# Patient Record
Sex: FEMALE | Race: ASIAN | NOT HISPANIC OR LATINO
[De-identification: names, ages, dates, MRNs, and addresses within clinical notes are randomized per-mention and may not be internally consistent; named-entity substitution may affect disease eponyms.]

---

## 2017-02-23 ENCOUNTER — APPOINTMENT (OUTPATIENT)
Dept: OBGYN | Facility: CLINIC | Age: 52
End: 2017-02-23

## 2017-03-23 ENCOUNTER — APPOINTMENT (OUTPATIENT)
Dept: OBGYN | Facility: CLINIC | Age: 52
End: 2017-03-23

## 2017-05-04 ENCOUNTER — APPOINTMENT (OUTPATIENT)
Dept: OBGYN | Facility: CLINIC | Age: 52
End: 2017-05-04

## 2017-05-04 VITALS
RESPIRATION RATE: 14 BRPM | DIASTOLIC BLOOD PRESSURE: 78 MMHG | WEIGHT: 150 LBS | HEIGHT: 61 IN | OXYGEN SATURATION: 99 % | SYSTOLIC BLOOD PRESSURE: 100 MMHG | BODY MASS INDEX: 28.32 KG/M2 | HEART RATE: 56 BPM

## 2017-05-04 DIAGNOSIS — Z82.61 FAMILY HISTORY OF ARTHRITIS: ICD-10-CM

## 2017-05-04 DIAGNOSIS — Z13.820 ENCOUNTER FOR SCREENING FOR OSTEOPOROSIS: ICD-10-CM

## 2017-05-04 DIAGNOSIS — Z01.419 ENCOUNTER FOR GYNECOLOGICAL EXAMINATION (GENERAL) (ROUTINE) W/OUT ABNORMAL FINDINGS: ICD-10-CM

## 2017-05-04 DIAGNOSIS — R10.2 PELVIC AND PERINEAL PAIN: ICD-10-CM

## 2017-05-04 DIAGNOSIS — Z12.39 ENCOUNTER FOR OTHER SCREENING FOR MALIGNANT NEOPLASM OF BREAST: ICD-10-CM

## 2017-05-05 LAB — HPV HIGH+LOW RISK DNA PNL CVX: NEGATIVE

## 2017-05-10 LAB — CYTOLOGY CVX/VAG DOC THIN PREP: NORMAL

## 2019-03-26 ENCOUNTER — OUTPATIENT (OUTPATIENT)
Dept: OUTPATIENT SERVICES | Facility: HOSPITAL | Age: 54
LOS: 1 days | Discharge: HOME | End: 2019-03-26

## 2019-03-27 DIAGNOSIS — F32.9 MAJOR DEPRESSIVE DISORDER, SINGLE EPISODE, UNSPECIFIED: ICD-10-CM

## 2019-06-13 ENCOUNTER — APPOINTMENT (OUTPATIENT)
Dept: OBGYN | Facility: CLINIC | Age: 54
End: 2019-06-13

## 2019-07-31 ENCOUNTER — EMERGENCY (EMERGENCY)
Facility: HOSPITAL | Age: 54
LOS: 1 days | Discharge: HOME | End: 2019-07-31
Attending: EMERGENCY MEDICINE | Admitting: EMERGENCY MEDICINE
Payer: OTHER MISCELLANEOUS

## 2019-07-31 VITALS
HEART RATE: 67 BPM | OXYGEN SATURATION: 100 % | SYSTOLIC BLOOD PRESSURE: 154 MMHG | DIASTOLIC BLOOD PRESSURE: 90 MMHG | RESPIRATION RATE: 16 BRPM | TEMPERATURE: 98 F

## 2019-07-31 PROCEDURE — 99284 EMERGENCY DEPT VISIT MOD MDM: CPT

## 2019-07-31 RX ORDER — ACETAMINOPHEN 500 MG
975 TABLET ORAL ONCE
Refills: 0 | Status: COMPLETED | OUTPATIENT
Start: 2019-07-31 | End: 2019-07-31

## 2019-07-31 RX ADMIN — Medication 975 MILLIGRAM(S): at 22:01

## 2019-07-31 NOTE — ED ADULT TRIAGE NOTE - CHIEF COMPLAINT QUOTE
pt s/p slip and fall. states she landed on her left side and is endorsing left sided head pain. no loc or blood thinner use

## 2019-07-31 NOTE — ED PROVIDER NOTE - CLINICAL SUMMARY MEDICAL DECISION MAKING FREE TEXT BOX
s/p mechanical fall earlier today now with HA, no neuro deficits on the exam, clinically concussion - will get ct head, pain control, will reassess;

## 2019-07-31 NOTE — ED PROVIDER NOTE - ATTENDING CONTRIBUTION TO CARE
Davidsonjaved: 55 yo female with a h/o DM s/p mechanical trip and fall with head trauma. No LOC. + left sided headache. + left sided neck and shoulder pain. No nausea or vomiting. No weakness or paresthesias. No chest or abdominal pain, or SOB. No anticoagulation use. Exam: GENERAL: well appearing, NAD, HEENT: MMM, PERRLA, CARDIO: +S1/S2, no murmurs, rubs or gallops, LUNGS: CTA B/L, no wheezing, rales or rhonchi, ABD: soft, nontender, BSx4 quadrants, no guarding or rigidity. MSK: NO ribg ttp, NO shoulder TTP, no mdiline spinal TTP, + left sided paraspinal cervical TTP. EXT: No LE edema or calf TTP, 2+ distal pulses x 4 extremities. NEURO: AxOx3, CNII-XII intact, SKIN: no rashes or lesions, well perfused A/P- 55 yo female s/p head trauma with headache. normal neuro exam and no anticoagulation use but pt c/o persistent headache over temporal region and given area will CT head and treat symptomatically.

## 2019-07-31 NOTE — ED PROVIDER NOTE - NSFOLLOWUPINSTRUCTIONS_ED_ALL_ED_FT
Follow up with your PMD within 24-48 hrs hours.  Rest, Take Tylenol 650mg 1 tab every 4-6 hours as needed for pain . You may have a headache associated with nausea and lightheadedness in the next few hours/days. This is called a concussion and does not warrant return to the Emergency department unless you develop significant worsening of pain, profuse vomiting, dizziness, changes in vision, difficulty walking/speaking, weakness or numbness to your extremities. Worsening or new concerning symptoms return to the emergency department.Concussion, Adult  Image   A concussion is a brain injury from a direct hit (blow) to the head or body. This blow causes the brain to shake quickly back and forth inside the skull. This can damage brain cells and cause chemical changes in the brain. A concussion may also be known as a mild traumatic brain injury (TBI).    Concussions are usually not life-threatening, but the effects of a concussion can be serious. If you have a concussion, you should be very careful to avoid having a second concussion.    What are the causes?  This condition is caused by:  A direct blow to your head, such as from running into another player during a game, being hit in a fight, or hitting your head on a hard surface.  Sudden movement of your body that causes your brain to move back and forth inside the skull, such as in a car crash.  What are the signs or symptoms?  The signs of a concussion can be hard to notice. Early on, they may be missed by you, family members, and health care providers. You may look fine on the outside, but may act or feel differently.    Symptoms are usually temporary, and for most adults, the symptoms improve in 7–10 days. Some symptoms may appear right away, but other symptoms may not show up for hours or days. If your symptoms last longer than normal, you may have post-concussion syndrome. Every head injury is different.    Physical symptoms     Headaches. This can include a feeling of pressure in the head or migraine-like symptoms.  Tiredness (fatigue).  Dizziness.  Problems with coordination or balance.  Vision or hearing problems.  Sensitivity to light or noise.  Nausea or vomiting.  Changes in eating or sleeping patterns.  Numbness or tingling.  Mental and emotional symptoms     Memory problems.  Trouble concentrating, organizing, or making decisions.  Slowness in thinking, acting or reacting, speaking, or reading.  Irritability or mood changes.  Anxiety or depression.  How is this diagnosed?  This condition is diagnosed based on:  Your symptoms.  A description of your injury.  You may also have tests, including:  Imaging tests, such as a CT scan or MRI. These are done to look for signs of brain injury.  Neuropsychological tests. These measure your thinking, understanding, learning, and remembering abilities.  How is this treated?  Treatment for this condition includes:  Stopping sports or activity if you are injured. Anyone who hits their head or shows signs of a concussion should not return to sports or activities the same day, and they should not return until they are checked by a health care provider.  Physical and mental rest and careful observation, usually at home. Gradually return to your normal activities.  Medicine. You may be prescribed medicines to help with symptoms such as headaches, nausea, or difficulty sleeping.  Avoid taking opioid pain medicine while recovering from a concussion.  Avoiding alcohol and drugs. Alcohol and certain drugs may slow your recovery and can put you at risk of further injury.  Referral to a concussion clinic or rehabilitation center.  How fast you will recover from a concussion depends on many factors. Recovery can take time. It is important to wait to return to activity until a health care provider says that it is safe and your symptoms are completely gone.    Follow these instructions at home:  Activity     Limit activities that require a lot of thought or concentration, such as:  Doing homework or job-related work.  Watching TV.  Working on the computer.  Playing memory games and puzzles.  Rest. Rest helps your brain heal. Make sure you:  Get plenty of sleep. Most adults should get 7–9 hours of sleep each night.  Rest during the day. Take naps or rest breaks when you feel tired.  Do not do high-risk activities that could cause a second concussion, such as riding a bike or playing sports. Having another concussion before the first one has healed can be dangerous.  Ask your health care provider when you can return to your normal activities, such as school, work, athletics, and driving. Your ability to react may be slower after a brain injury. Never do these activities if you are dizzy. Your health care provider will likely give you a plan for gradually returning to activities.  General instructions     Take over-the-counter and prescription medicines only as told by your health care provider. Some medicines, such as blood thinners (anticoagulants) and aspirin, may increase the chance of complications, such as bleeding.  Do not drink alcohol until your health care provider says you can.  Watch your symptoms and tell others to do the same. Complications sometimes occur after a concussion. Older adults with a brain injury may have a higher risk of serious complications.  Tell your , teachers, school nurse, school counselor, , or  about your injury, symptoms, and restrictions. Tell them about what you can or cannot do. They should watch you for worsening symptoms.  Keep all follow-up visits as told by your health care provider. This is important.  How is this prevented?  Avoiding another brain injury is very important, especially as you recover. In rare cases, another injury can lead to permanent brain damage, brain swelling, or death. The risk of this is greatest during the first 7–10 days after a head injury. Avoid injuries by:  Avoiding activities that could lead to a second concussion, such as contact or recreational sports, until your health care provider says it is okay.  Taking these actions once you have returned to sports or activities:  Avoiding plays or moves that can cause you to crash into another person. This is how most concussions occur.  Following the rules and being respectful of other players. Do not engage in violent or illegal plays.  Getting regular exercise that includes strength and balance training.  Wear a properly fitting helmet during sports, biking, or other activities. Helmets can help protect you from serious skull and brain injuries, but they do not protect you from a concussion. Even when wearing a helmet, you should avoid being hit in the head.    Contact a health care provider if:  Your symptoms get worse or they do not improve.  You have new symptoms.  You have another injury.  Get help right away if:  You have severe or worsening headaches.  You have weakness or numbness in any part of your body.  You are confused.  Your coordination gets worse.  You vomit repeatedly.  You are sleepier than normal.  You have convulsions.  Your speech is slurred.  You cannot recognize people or places.  You have a seizure.  It is difficult to wake you up.  You have unusual behavior changes.  You have changes in your vision.  You lose consciousness.  Summary  A concussion is a brain injury from a direct hit (blow) to your head or body.  You may have imaging tests and neuropsychological tests to diagnose a concussion.  Treatment for this condition includes physical and mental rest and careful observation.  Ask your health care provider when you can return to your normal activities, such as school, work, athletics, and

## 2019-08-01 PROCEDURE — 70450 CT HEAD/BRAIN W/O DYE: CPT | Mod: 26

## 2019-08-01 PROCEDURE — 70480 CT ORBIT/EAR/FOSSA W/O DYE: CPT | Mod: 26,59

## 2019-08-02 ENCOUNTER — TRANSCRIPTION ENCOUNTER (OUTPATIENT)
Age: 54
End: 2019-08-02

## 2020-04-25 ENCOUNTER — MESSAGE (OUTPATIENT)
Age: 55
End: 2020-04-25

## 2020-05-12 LAB
SARS-COV-2 IGG SERPL IA-ACNC: 5.9 INDEX
SARS-COV-2 IGG SERPL QL IA: POSITIVE

## 2024-06-15 ENCOUNTER — EMERGENCY (EMERGENCY)
Facility: HOSPITAL | Age: 59
LOS: 1 days | Discharge: HOME | End: 2024-06-15
Attending: STUDENT IN AN ORGANIZED HEALTH CARE EDUCATION/TRAINING PROGRAM | Admitting: STUDENT IN AN ORGANIZED HEALTH CARE EDUCATION/TRAINING PROGRAM
Payer: COMMERCIAL

## 2024-06-15 VITALS
OXYGEN SATURATION: 100 % | DIASTOLIC BLOOD PRESSURE: 80 MMHG | TEMPERATURE: 98 F | RESPIRATION RATE: 16 BRPM | WEIGHT: 139.99 LBS | HEART RATE: 70 BPM | SYSTOLIC BLOOD PRESSURE: 150 MMHG

## 2024-06-15 PROBLEM — E11.9 TYPE 2 DIABETES MELLITUS WITHOUT COMPLICATIONS: Chronic | Status: ACTIVE | Noted: 2019-07-31

## 2024-06-15 LAB
ADD ON TEST-SPECIMEN IN LAB: SIGNIFICANT CHANGE UP
ALBUMIN SERPL ELPH-MCNC: 4 G/DL — SIGNIFICANT CHANGE UP (ref 3.3–5)
ALP SERPL-CCNC: 75 U/L — SIGNIFICANT CHANGE UP (ref 40–120)
ALT FLD-CCNC: 13 U/L — SIGNIFICANT CHANGE UP (ref 4–33)
ANION GAP SERPL CALC-SCNC: 10 MMOL/L — SIGNIFICANT CHANGE UP (ref 7–14)
ANISOCYTOSIS BLD QL: SLIGHT — SIGNIFICANT CHANGE UP
AST SERPL-CCNC: 18 U/L — SIGNIFICANT CHANGE UP (ref 4–32)
BASOPHILS # BLD AUTO: 0 K/UL — SIGNIFICANT CHANGE UP (ref 0–0.2)
BASOPHILS NFR BLD AUTO: 0 % — SIGNIFICANT CHANGE UP (ref 0–2)
BILIRUB SERPL-MCNC: 0.3 MG/DL — SIGNIFICANT CHANGE UP (ref 0.2–1.2)
BUN SERPL-MCNC: 16 MG/DL — SIGNIFICANT CHANGE UP (ref 7–23)
CALCIUM SERPL-MCNC: 10.3 MG/DL — SIGNIFICANT CHANGE UP (ref 8.4–10.5)
CHLORIDE SERPL-SCNC: 105 MMOL/L — SIGNIFICANT CHANGE UP (ref 98–107)
CO2 SERPL-SCNC: 23 MMOL/L — SIGNIFICANT CHANGE UP (ref 22–31)
CREAT SERPL-MCNC: 0.77 MG/DL — SIGNIFICANT CHANGE UP (ref 0.5–1.3)
DACRYOCYTES BLD QL SMEAR: SLIGHT — SIGNIFICANT CHANGE UP
EGFR: 89 ML/MIN/1.73M2 — SIGNIFICANT CHANGE UP
ELLIPTOCYTES BLD QL SMEAR: SIGNIFICANT CHANGE UP
EOSINOPHIL # BLD AUTO: 0.34 K/UL — SIGNIFICANT CHANGE UP (ref 0–0.5)
EOSINOPHIL NFR BLD AUTO: 5.2 % — SIGNIFICANT CHANGE UP (ref 0–6)
FLUAV AG NPH QL: SIGNIFICANT CHANGE UP
FLUBV AG NPH QL: SIGNIFICANT CHANGE UP
GIANT PLATELETS BLD QL SMEAR: PRESENT — SIGNIFICANT CHANGE UP
GLUCOSE SERPL-MCNC: 110 MG/DL — HIGH (ref 70–99)
HCT VFR BLD CALC: 35.7 % — SIGNIFICANT CHANGE UP (ref 34.5–45)
HGB BLD-MCNC: 11.3 G/DL — LOW (ref 11.5–15.5)
HYPOCHROMIA BLD QL: SLIGHT — SIGNIFICANT CHANGE UP
IANC: 3.32 K/UL — SIGNIFICANT CHANGE UP (ref 1.8–7.4)
LYMPHOCYTES # BLD AUTO: 2.07 K/UL — SIGNIFICANT CHANGE UP (ref 1–3.3)
LYMPHOCYTES # BLD AUTO: 31.9 % — SIGNIFICANT CHANGE UP (ref 13–44)
MACROCYTES BLD QL: SLIGHT — SIGNIFICANT CHANGE UP
MCHC RBC-ENTMCNC: 21.2 PG — LOW (ref 27–34)
MCHC RBC-ENTMCNC: 31.7 GM/DL — LOW (ref 32–36)
MCV RBC AUTO: 66.9 FL — LOW (ref 80–100)
MICROCYTES BLD QL: SLIGHT — SIGNIFICANT CHANGE UP
MONOCYTES # BLD AUTO: 0.17 K/UL — SIGNIFICANT CHANGE UP (ref 0–0.9)
MONOCYTES NFR BLD AUTO: 2.6 % — SIGNIFICANT CHANGE UP (ref 2–14)
NEUTROPHILS # BLD AUTO: 3.69 K/UL — SIGNIFICANT CHANGE UP (ref 1.8–7.4)
NEUTROPHILS NFR BLD AUTO: 56.9 % — SIGNIFICANT CHANGE UP (ref 43–77)
PLAT MORPH BLD: NORMAL — SIGNIFICANT CHANGE UP
PLATELET # BLD AUTO: 275 K/UL — SIGNIFICANT CHANGE UP (ref 150–400)
PLATELET COUNT - ESTIMATE: NORMAL — SIGNIFICANT CHANGE UP
POIKILOCYTOSIS BLD QL AUTO: SLIGHT — SIGNIFICANT CHANGE UP
POLYCHROMASIA BLD QL SMEAR: SLIGHT — SIGNIFICANT CHANGE UP
POTASSIUM SERPL-MCNC: 4 MMOL/L — SIGNIFICANT CHANGE UP (ref 3.5–5.3)
POTASSIUM SERPL-SCNC: 4 MMOL/L — SIGNIFICANT CHANGE UP (ref 3.5–5.3)
PROT SERPL-MCNC: 7.6 G/DL — SIGNIFICANT CHANGE UP (ref 6–8.3)
RBC # BLD: 5.34 M/UL — HIGH (ref 3.8–5.2)
RBC # FLD: 20.3 % — HIGH (ref 10.3–14.5)
RBC BLD AUTO: ABNORMAL
RSV RNA NPH QL NAA+NON-PROBE: SIGNIFICANT CHANGE UP
SARS-COV-2 RNA SPEC QL NAA+PROBE: SIGNIFICANT CHANGE UP
SCHISTOCYTES BLD QL AUTO: SLIGHT — SIGNIFICANT CHANGE UP
SODIUM SERPL-SCNC: 138 MMOL/L — SIGNIFICANT CHANGE UP (ref 135–145)
TARGETS BLD QL SMEAR: SLIGHT — SIGNIFICANT CHANGE UP
VARIANT LYMPHS # BLD: 3.4 % — SIGNIFICANT CHANGE UP (ref 0–6)
WBC # BLD: 6.49 K/UL — SIGNIFICANT CHANGE UP (ref 3.8–10.5)
WBC # FLD AUTO: 6.49 K/UL — SIGNIFICANT CHANGE UP (ref 3.8–10.5)

## 2024-06-15 PROCEDURE — 99285 EMERGENCY DEPT VISIT HI MDM: CPT

## 2024-06-15 RX ADMIN — Medication 100 MILLIGRAM(S): at 17:44

## 2024-06-15 RX ADMIN — Medication 60 MILLIGRAM(S): at 17:42

## 2024-06-15 NOTE — ED PROVIDER NOTE - PROGRESS NOTE DETAILS
Nini Sands MD, PGY2:  labs are nonactionable. All findings on labs  communicated with patient, all questions answered at this time.  Rx sent to pharmacy. Patient given strict return precautions and directions to follow-up with their primary care physician within the next week.  Patient verbalized understanding and agrees with plan.  Patient ready for discharge.

## 2024-06-15 NOTE — ED PROVIDER NOTE - PHYSICAL EXAMINATION
GENERAL: well appearing in no acute distress  HEAD: normocephalic, atraumatic  HEENT: normal conjunctiva, oral mucosa moist, uvula midline, ear canals and TMs clear b/l   CARDIAC: regular rate and rhythm, no appreciable murmurs  PULM: normal breath sounds, clear to ascultation bilaterally, no rales, rhonchi, wheezing  GI: abdomen nondistended, soft, nontender, no guarding, rebound tenderness  NEURO: no sensory deficits, right sided facial droop involving the forehead, weakness with right eye closure, widening of the palpebral fissure of the right eye, normal speech, PERRLA, EOMI, AAOx3  MSK: no peripheral edema, no calf tenderness b/l, no midline spinal ttp, neck ROM full and intact strength 5/5 in UE and LE B/L   SKIN: well-perfused, extremities warm, erythematous oval patch on the upper abdomen, no other rashes noted   PSYCH: appropriate mood and affect Cardiac

## 2024-06-15 NOTE — ED PROVIDER NOTE - CLINICAL SUMMARY MEDICAL DECISION MAKING FREE TEXT BOX
59-year-old female with history of type 2 diabetes, hyperlipidemia and prior lung disease presenting for evaluation of her right sided facial droop involving the forehead and the lower face that she noticed this morning after she went camping 1 week prior.  Patient with a rash on her abdomen.    Differential diagnosis includes but is not limited to Bell's palsy secondary to possible Lyme disease versus other viral infection, versus less likely stroke.  No evidence of vesicles in the auditory canals to suggest [place] Hunt syndrome.  Plan to obtain CT head and neck, labs including Lyme titers, will give prednisone and doxycycline for Bell's palsy and empiric Lyme treatment.  Anticipate patient will be discharged with outpatient follow-up. 59-year-old female with history of type 2 diabetes, hyperlipidemia and prior lung disease presenting for evaluation of her right sided facial droop involving the forehead and the lower face that she noticed this morning after she went camping 1 week prior.  Patient with a rash on her abdomen, joint pain and myalgias.     Differential diagnosis includes but is not limited to Bell's palsy secondary to possible Lyme disease versus other viral infection,  not suspect stroke or mass at this time given risk factors and exam findings consistent with bells palsy from lyme disease.  No evidence of vesicles in the auditory canals to suggest [place] Hunt syndrome.  Plan to obtain labs including Lyme titers, will give prednisone and doxycycline for Bell's palsy and empiric Lyme treatment. will give artificial tears  Anticipate patient will be discharged with outpatient follow-up.

## 2024-06-15 NOTE — ED ADULT TRIAGE NOTE - CHIEF COMPLAINT QUOTE
Pt c/o of rt facial droop that she noticed yesterday around 7pm, states "whole rt side of face feels paralyzed." Also states she felt unsteady on her feet yesterday morning after waking up. Denies new complaints today. Denies blurry vision, dizziness, chest pain, unilateral weakness. pmhX: dm2

## 2024-06-15 NOTE — ED PROVIDER NOTE - PATIENT PORTAL LINK FT
You can access the FollowMyHealth Patient Portal offered by [facility] by registering at the following website: [email or web address]. By joining [facility]’s FollowMyHealth portal, you will also be able to view your health information using other applications (apps) compatible with our system.

## 2024-06-15 NOTE — ED PROVIDER NOTE - ATTENDING CONTRIBUTION TO CARE
60 yo F hx DM2 on Mounjaro, HLD, Lyme disease with hx arthralgia, pw c/o R facial droop and difficulty closing lid on R. Reports camping one week ago, and pt also has a dog at home, lives on [address]. Reports myalgias and joint pain to hand. Denies numbness/tingling or weakness to extremities. No fevers/chills. Pt reports rash to abdomen.    VITALS: reviewed  GEN: NAD, A & O x 4  HEAD/EYES: NCAT, EOMI, decreased lid closure on R, R facial droop, anicteric sclerae,   ENT: mucus membranes moist, oropharynx WNL, trachea midline,  RESP: lungs CTA with equal breath sounds bilaterally, chest wall nontender and atraumatic  CV: heart with reg rhythm S1, S2, distal pulses intact and symmetric bilaterally  ABDOMEN: normoactive bowel sounds,  soft, nondistended, nontender, no palpable masses  : no CVAT  MSK: extremities atraumatic and nontender, no edema, no asymmetry.  SKIN: warm, dry,no bruising, no cyanosis. color appropriate for ethnicity. rash to mid abdomen,  NEURO: alert, mentating appropriately, no facial asymmetry.   PSYCH: Affect appropriate    Pt with Bell's palsy- asymmetric eyebrow raise, decreased eyelid closure, and asymmetric smile. Otherwise neuro intact. Pt with rash and possible tic exposure. Will obtain labs, including Lyme, and tx for Lyme, dc with recs for ID and neuro follow up. Pt instructed that steroids can elevate blood sugar and instructed to follow up with PCP

## 2024-06-15 NOTE — ED PROVIDER NOTE - CARE PLAN
1 Principal Discharge DX:	Facial droop   Principal Discharge DX:	Facial droop  Secondary Diagnosis:	Bell's palsy

## 2024-06-15 NOTE — ED PROVIDER NOTE - OBJECTIVE STATEMENT
59-year-old female history of type 2 diabetes on Mounjaro, hyperlipidemia, prior Lyme disease infection 14 years ago presents for evaluation of right-sided facial droop that she noticed this morning associated with right-sided neck pain.  reports her right side of the face feels paralyzed. Patient states she went camping 1 week ago.  Denies any known tick bites, states she used tick repellent.  Patient reports a burning sensation in her right eye, states it is difficult for her to close her right eye.   reports she noticed a rash on her abdomen after camping that is pruritic . she feels stiffness in the joints of her right hand which is similar d to her prior Lyme disease infection.  Denies chest pain, fever, shortness of breath, difficulty walking, headache, vision changes, weakness, diarrhea, dysuria, hearing changes.

## 2024-06-15 NOTE — ED PROVIDER NOTE - NSFOLLOWUPINSTRUCTIONS_ED_ALL_ED_FT
Xavier Palsy    WHAT YOU NEED TO KNOW:  Bell palsy is a sudden weakness or paralysis of one side of your face. Bell palsy occurs when the nerve that controls the muscles in your face becomes swollen or irritated.    DISCHARGE INSTRUCTIONS:    Medicines:  Medicine may be given to decrease swelling and irritation of your facial nerve. You may receive antiviral medicine if your healthcare provider thinks a virus caused your Bell palsy. Your healthcare provider may also suggest acetaminophen or ibuprofen to reduce pain. These medicines are available without a doctor's order. Ask which medicine to take, and how much you need. Follow directions. Acetaminophen can cause liver damage, and ibuprofen can cause stomach bleeding or kidney damage.  Take your medicine as directed. Contact your healthcare provider if you think your medicine is not helping or if you have side effects. Tell him if you are allergic to any medicine. Keep a list of the medicines, vitamins, and herbs you take. Include the amounts, and when and why you take them. Bring the list or the pill bottles to follow-up visits. Carry your medicine list with you in case of an emergency.  Follow up with your healthcare provider as directed: Write down your questions so you remember to ask them during your visits.  Eye care: Your healthcare provider may recommend that you use artificial tears during the day to keep your eye moist. You may need to use an eye ointment at night. You may also need to wear a patch over your eye and tape it shut while you sleep. This helps to keep your eye from getting dry and infected. Wear sunglasses to protect your eye from direct sunlight. Stay away from places that have fumes, dust, or other particles in the air that may harm your eye.  Physical therapy: A physical therapist may teach you how to massage your face and exercise the nerves and muscles in your face. This may help you get better sooner and prevent long-term problems. You can exercise on your own when your facial movement begins to return. Open and close your eye, wink, and smile wide. Do the exercises for 15 or 20 minutes several times a day.  Contact your healthcare provider if:  You have a fever.  Your eye becomes red, irritated, or painful.  You have questions or concerns about your condition or care.  Seek care immediately or call 911 if:  You develop weakness or numbness on one side of your body (other than your face).  You have double vision, or you lose vision in your eye.  You have trouble thinking clearly. You were seen for right facial droop.     Your exam is consistent with Bell's Palsy. We suspect that it is from lyme disease.     We sent a prescription to your pharmacy for prednisone, a steroid, that you should take for 6 more days.     We also sent you antibiotics, doxycycline, to your pharmacy for lyme disease. Please take this medication with food. please avoid direct sun exposure.      Please use refresh tears and tape your eye closed at night. Please wear covering over your eyes/ sun glasses to prevent dust and debris into your eye.     Please return to the emergency room if you experience any new or worsening symptoms.     Please see your primary care doctor this week.     You will receive a phone call with the results of your lyme test later this week.     Xavier Palsy    WHAT YOU NEED TO KNOW:  Bell palsy is a sudden weakness or paralysis of one side of your face. Bell palsy occurs when the nerve that controls the muscles in your face becomes swollen or irritated.    DISCHARGE INSTRUCTIONS:    Medicines:  Medicine may be given to decrease swelling and irritation of your facial nerve. You may receive antiviral medicine if your healthcare provider thinks a virus caused your Bell palsy     Your healthcare provider may also suggest acetaminophen or ibuprofen to reduce pain. These medicines are available without a doctor's order. Ask which medicine to take, and how much you need. Follow directions. Acetaminophen can cause liver damage, and ibuprofen can cause stomach bleeding or kidney damage.    Take your medicine as directed. Contact your healthcare provider if you think your medicine is not helping or if you have side effects.     Tell him if you are allergic to any medicine. Keep a list of the medicines, vitamins, and herbs you take. Include the amounts, and when and why you take them. Bring the list or the pill bottles to follow-up visits. Carry your medicine list with you in case of an emergency.    Follow up with your healthcare provider as directed: Write down your questions so you remember to ask them during your visits.    Eye care: Your healthcare provider may recommend that you use artificial tears during the day to keep your eye moist. You may need to use an eye ointment at night. You may also need to wear a patch over your eye and tape it shut while you sleep. This helps to keep your eye from getting dry and infected.   Wear sunglasses to protect your eye from direct sunlight. Stay away from places that have fumes, dust, or other particles in the air that may harm your eye.    Physical therapy: A physical therapist may teach you how to massage your face and exercise the nerves and muscles in your face. This may help you get better sooner and prevent long-term problems. You can exercise on your own when your facial movement begins to return. Open and close your eye, wink, and smile wide. Do the exercises for 15 or 20 minutes several times a day.    Contact your healthcare provider if:  You have a fever.  Your eye becomes red, irritated, or painful.  You have questions or concerns about your condition or care.  Seek care immediately or call 911 if:  You develop weakness or numbness on one side of your body (other than your face).  You have double vision, or you lose vision in your eye.  You have trouble thinking clearly.

## 2024-06-16 NOTE — ED POST DISCHARGE NOTE - REASON FOR FOLLOW-UP
Other Telephone follow up  request : Dr black requesting we follow up with patient's Lyme results : Borrelia burgdorferi DNA  by PCR and IgG/Igm. Patient seen in ED with Vance palsy and a rash. Patient prescribed Doxycyline presently Lyme test still testing.. Call back  P.A. to follow results to final.

## 2024-06-17 LAB
B BURGDOR C6 AB SER-ACNC: NEGATIVE — SIGNIFICANT CHANGE UP
B BURGDOR IGG+IGM SER-ACNC: 0.32 INDEX — SIGNIFICANT CHANGE UP (ref 0.01–0.89)

## 2024-06-19 LAB — B BURGDOR DNA SPEC QL NAA+PROBE: NEGATIVE — SIGNIFICANT CHANGE UP

## 2025-07-13 ENCOUNTER — NON-APPOINTMENT (OUTPATIENT)
Age: 60
End: 2025-07-13